# Patient Record
Sex: FEMALE | Race: BLACK OR AFRICAN AMERICAN | Employment: FULL TIME | URBAN - METROPOLITAN AREA
[De-identification: names, ages, dates, MRNs, and addresses within clinical notes are randomized per-mention and may not be internally consistent; named-entity substitution may affect disease eponyms.]

---

## 2017-01-03 ENCOUNTER — MYC MEDICAL ADVICE (OUTPATIENT)
Dept: FAMILY MEDICINE | Facility: CLINIC | Age: 42
End: 2017-01-03

## 2017-01-03 DIAGNOSIS — N92.1 MENORRHAGIA WITH IRREGULAR CYCLE: Primary | ICD-10-CM

## 2017-01-03 RX ORDER — MEDROXYPROGESTERONE ACETATE 10 MG
TABLET ORAL
Qty: 10 TABLET | Refills: 0 | Status: SHIPPED | OUTPATIENT
Start: 2017-01-03 | End: 2018-01-10

## 2017-01-18 ENCOUNTER — INFUSION THERAPY VISIT (OUTPATIENT)
Dept: INFUSION THERAPY | Facility: CLINIC | Age: 42
End: 2017-01-18
Attending: FAMILY MEDICINE
Payer: COMMERCIAL

## 2017-01-18 VITALS
OXYGEN SATURATION: 99 % | SYSTOLIC BLOOD PRESSURE: 155 MMHG | DIASTOLIC BLOOD PRESSURE: 73 MMHG | TEMPERATURE: 99.5 F | HEART RATE: 95 BPM | RESPIRATION RATE: 18 BRPM

## 2017-01-18 DIAGNOSIS — D50.8 OTHER IRON DEFICIENCY ANEMIA: Primary | ICD-10-CM

## 2017-01-18 PROCEDURE — 25000125 ZZHC RX 250: Mod: ZF | Performed by: FAMILY MEDICINE

## 2017-01-18 PROCEDURE — 25000132 ZZH RX MED GY IP 250 OP 250 PS 637: Mod: ZF | Performed by: FAMILY MEDICINE

## 2017-01-18 PROCEDURE — 96365 THER/PROPH/DIAG IV INF INIT: CPT | Mod: ZF

## 2017-01-18 PROCEDURE — 25000128 H RX IP 250 OP 636: Mod: ZF | Performed by: FAMILY MEDICINE

## 2017-01-18 PROCEDURE — 96366 THER/PROPH/DIAG IV INF ADDON: CPT | Mod: ZF

## 2017-01-18 RX ORDER — ACETAMINOPHEN 325 MG/1
650 TABLET ORAL
Status: DISCONTINUED | OUTPATIENT
Start: 2017-01-18 | End: 2017-01-18 | Stop reason: HOSPADM

## 2017-01-18 RX ORDER — DIPHENHYDRAMINE HCL 25 MG
25 CAPSULE ORAL
Status: DISCONTINUED | OUTPATIENT
Start: 2017-01-18 | End: 2017-01-18 | Stop reason: HOSPADM

## 2017-01-18 RX ADMIN — IRON SUCROSE 300 MG: 20 INJECTION, SOLUTION INTRAVENOUS at 09:22

## 2017-01-18 RX ADMIN — ACETAMINOPHEN 650 MG: 325 TABLET ORAL at 09:01

## 2017-01-18 NOTE — MR AVS SNAPSHOT
After Visit Summary   1/18/2017    Aguilar Genao    MRN: [id number]           Patient Information     Date Of Birth          1975        Visit Information        Provider Department      1/18/2017 9:00 AM  51 ATC;  SPEC INFUSION [facility] Specialty [facility] Procedure        Today's Diagnoses     Other iron deficiency anemia    -  1       Care Instructions    Dear Aguilar Genao    Thank you for choosing [facility] Special[facility] Infusion [facility] ([facility]) for your infusion.  The following information is a summary of our appointment as well as important reminders.        Patient Education    Iron Sucrose Chewable tablet    Iron Sucrose Solution for injection  Iron Sucrose Solution for injection  What is this medicine?  IRON SUCROSE (AHY perez MARIN krohs) is an iron complex. Iron is used to make healthy red blood cells, which carry oxygen and nutrients throughout the body. This medicine is used to treat iron deficiency anemia in people with chronic kidney disease.  This medicine may be used for other purposes; ask your health care provider or pharmacist if you have questions.  What should I tell my health care provider before I take this medicine?  They need to know if you have any of these conditions:    anemia not caused by low iron levels    heart disease    high levels of iron in the blood    kidney disease    liver disease    an unusual or allergic reaction to iron, other medicines, foods, dyes, or preservatives    pregnant or trying to get pregnant    breast-feeding  How should I use this medicine?  This medicine is for infusion into a vein. It is given by a health care professional in a hospital or clinic setting.  Talk to your pediatrician regarding the use of this medicine in children. While this drug may be prescribed for children as young as 2 years for selected conditions, precautions do apply.  Overdosage: If you think you have taken  too much of this medicine contact a poison control center or emergency room at once.  NOTE: This medicine is only for you. Do not share this medicine with others.  What if I miss a dose?  It is important not to miss your dose. Call your doctor or health care professional if you are unable to keep an appointment.  What may interact with this medicine?  Do not take this medicine with any of the following medications:    deferoxamine    dimercaprol    other iron products  This medicine may also interact with the following medications:    chloramphenicol    deferasirox  This list may not describe all possible interactions. Give your health care provider a list of all the medicines, herbs, non-prescription drugs, or dietary supplements you use. Also tell them if you smoke, drink alcohol, or use illegal drugs. Some items may interact with your medicine.  What should I watch for while using this medicine?  Visit your doctor or healthcare professional regularly. Tell your doctor or healthcare professional if your symptoms do not start to get better or if they get worse. You may need blood work done while you are taking this medicine.  You may need to follow a special diet. Talk to your doctor. Foods that contain iron include: whole grains/cereals, dried fruits, beans, or peas, leafy green vegetables, and organ meats (liver, kidney).  What side effects may I notice from receiving this medicine?  Side effects that you should report to your doctor or health care professional as soon as possible:    allergic reactions like skin rash, itching or hives, swelling of the face, lips, or tongue    breathing problems    changes in blood pressure    cough    fast, irregular heartbeat    feeling faint or lightheaded, falls    fever or chills    flushing, sweating, or hot feelings    joint or muscle aches/pains    seizures    swelling of the ankles or feet    unusually weak or tired  Side effects that usually do not require medical  attention (report to your doctor or health care professional if they continue or are bothersome):    diarrhea    feeling achy    headache    irritation at site where injected    nausea, vomiting    stomach upset    tiredness  This list may not describe all possible side effects. Call your doctor for medical advice about side effects. You may report side effects to FDA at 4-528-PLC-5502.  Where should I keep my medicine?  This drug is given in a hospital or clinic and will not be stored at home.  NOTE:This sheet is a summary. It may not cover all possible information. If you have questions about this medicine, talk to your doctor, pharmacist, or health care provider. Copyright  2016 Gold Standard          Additional information: you had your first infusion of Venofer today.       We look forward in seeing you on your next appointment here at [facility].  Please don t hesitate to call us at [phone] to reschedule any of your appointments or to speak with one of the [facility] registered nurses.  It was a pleasure taking care of you today.    Sincerely,  Angelina Nino RN  [facility]  Specialty Infusion & Procedure Center  [address] [place] [address]  [address]  Phone:  [phone]          Follow-ups after your visit        Your next 10 appointments already scheduled     Jan 19, 2017 11:00 AM   Fibroid Visit with Mahi Lemon MD   [facility] ([facility])    [facility] [address]  [address],[facility] [address]  [address] [id number] [phone]            Jan 24, 2017  3:00 PM   Infusion 120 with UC SPEC INFUSION, UC 45 ATC   [facility] Special[facility] ( [facility])    [address] [place] [address] [phone]            Jan 31, 2017  2:00 PM   Infusion 120 with UC SPEC INFUSION, UC 45 ATC   [facility] [place] Specialty and Procedure  ([facility])    [address] [place] [address]  2nd Floor  [facility] [id number] [phone]            Feb 02, 2017  8:45 AM   LAB with  LAB   [facility] ([facility])    [address] [place] [address]  1st [facility] [id number] [phone]           Patient must bring picture ID.  Patient should be prepared to give a urine specimen  Please do not eat 10-12 hours before your appointment if you are coming in fasting for labs on lipids, cholesterol, or glucose (sugar).  Pregnant women should follow their Care Team instructions. Water with medications is okay. Do not drink coffee or other fluids.   If you have concerns about taking  your medications, please ask at office or if scheduling via [employer], send a message by clicking on Secure Messaging, Message Your Care Team.            Feb 02, 2017  9:25 AM   (Arrive by 9:10 AM)   Return Visit with Aren Ricks MD   [facility] ([facility])    [address] [place] [address]  4th [facility] [address] [phone]              Who to contact     If you have questions or need follow up information about today's clinic visit or your schedule please contact [facility] SPECIALTY AND PROCEDURE directly at [phone].  Normal or non-critical lab and imaging results will be communicated to you by [employer], letter or phone within 4 business days after the clinic has received the results. If you do not hear from us within 7 days, please contact the clinic through [employer] or phone. If you have a critical or abnormal lab result, we will notify you by phone as soon as possible.  Submit refill requests through [employer] or call your pharmacy and they will forward the refill request to us. Please allow 3 business days for your refill to be completed.          Additional Information About Your Visit        Predikt Information     [employer] gives you secure access  to your electronic health record. If you see a primary care provider, you can also send messages to your care team and make appointments. If you have questions, please call your primary care clinic.  If you do not have a primary care provider, please call [phone] and they will assist you.        Care EveryWhere ID     This is your Care EveryWhere ID. This could be used by other organizations to access your [facility] medical records  [id number]        Your Vitals Were     Pulse Temperature Respirations Pulse Oximetry Last Period Breastfeeding?    95 99.5  F (37.5  C) (Tympanic) 18 99% 12/28/2016 (Approximate) No       Blood Pressure from Last 3 Encounters:   01/18/17 155/73   11/16/16 133/88   10/27/16 135/84    Weight from Last 3 Encounters:   11/16/16 127.461 kg (281 lb)   10/27/16 127.325 kg (280 lb 11.2 oz)   08/15/16 125.737 kg (277 lb 3.2 oz)              Today, you had the following     No orders found for display       Primary Care Provider Office Phone # Fax #    Aren Ricks [phone]        PH[facility]ICIANS [address] [facility] [address]  [facility] [address]        Thank you!     Thank you for choosing [facility] SPECIALTY AND PROCEDURE  for your care. Our goal is always to provide you with excellent care. Hearing back from our patients is one way we can continue to improve our services. Please take a few minutes to complete the written survey that you may receive in the mail after your visit with us. Thank you!             Your Updated Medication List - Protect others around you: Learn how to safely use, store and throw away your medicines at www.disposemymeds.org.          This list is accurate as of: 1/18/17 10:24 AM.  Always use your most recent med list.                   Brand Name Dispense Instructions for use    Alcohol Wipes 70 % Pads     100 each    1 each every 30 days       BENADRYL 25 MG tablet   Generic drug:  diphenhydrAMINE      Take 50 mg by  "mouth every 6 hours as needed for itching       calcium carbonate 500 MG chewable tablet    TUMS    100 tablet    Take 1 tablet (500 mg) by mouth daily       cetirizine 10 MG tablet    zyrTEC    90 tablet    Take 1 tablet (10 mg) by mouth daily       clotrimazole 1 % cream    LOTRIMIN    60 g    Apply topically 2 times daily To areas of       cyanocobalamin 1000 MCG/ML injection    VITAMIN B12    1 mL    Inject 1 mL (1,000 mcg) into the muscle every 30 days       ferrous sulfate 220 (44 FE) MG/5ML Liqd     473 mL    Take 10 mLs by mouth 2 times daily (with meals)       hydrOXYzine 25 MG tablet    ATARAX    180 tablet    Take one or two at bedtime for pruritis or sleep       levothyroxine 25 MCG tablet    SYNTHROID/LEVOTHROID    90 tablet    Take 1 tablet (25 mcg) by mouth daily       medroxyPROGESTERone 10 MG tablet    PROVERA    10 tablet    1 tab po daily       Miconazole Nitrate 2 % Powd     500 g    Use twice daily to intertriginous areas for yeast infection when present       order for DME     1 Device    Equipment being ordered: shower chair       spironolactone 25 MG tablet    ALDACTONE    90 tablet    Take 1 tablet (25 mg) by mouth daily       Syringe Luer Lock 25G X 1\" 3 ML Misc     1 each    1 each every 30 days       traZODone 50 MG tablet    DESYREL    135 tablet    Take 1.5 tablets (75 mg) by mouth At Bedtime         "

## 2017-01-18 NOTE — PATIENT INSTRUCTIONS
Dear Aguilar Genao    Thank you for choosing [facility] Specialty Infusion [facility] Procedure Center ([facility]) for your infusion.  The following information is a summary of our appointment as well as important reminders.        Patient Education    Iron Sucrose Chewable tablet    Iron Sucrose Solution for injection  Iron Sucrose Solution for injection  What is this medicine?  IRON SUCROSE (DEVANTE FUNES krohs) is an iron complex. Iron is used to make healthy red blood cells, which carry oxygen and nutrients throughout the body. This medicine is used to treat iron deficiency anemia in people with chronic kidney disease.  This medicine may be used for other purposes; ask your health care provider or pharmacist if you have questions.  What should I tell my health care provider before I take this medicine?  They need to know if you have any of these conditions:    anemia not caused by low iron levels    heart disease    high levels of iron in the blood    kidney disease    liver disease    an unusual or allergic reaction to iron, other medicines, foods, dyes, or preservatives    pregnant or trying to get pregnant    breast-feeding  How should I use this medicine?  This medicine is for infusion into a vein. It is given by a health care professional in a hospital or clinic setting.  Talk to your pediatrician regarding the use of this medicine in children. While this drug may be prescribed for children as young as 2 years for selected conditions, precautions do apply.  Overdosage: If you think you have taken too much of this medicine contact a poison control center or emergency room at once.  NOTE: This medicine is only for you. Do not share this medicine with others.  What if I miss a dose?  It is important not to miss your dose. Call your doctor or health care professional if you are unable to keep an appointment.  What may interact with this medicine?  Do not take this medicine with any of the following  medications:    deferoxamine    dimercaprol    other iron products  This medicine may also interact with the following medications:    chloramphenicol    deferasirox  This list may not describe all possible interactions. Give your health care provider a list of all the medicines, herbs, non-prescription drugs, or dietary supplements you use. Also tell them if you smoke, drink alcohol, or use illegal drugs. Some items may interact with your medicine.  What should I watch for while using this medicine?  Visit your doctor or healthcare professional regularly. Tell your doctor or healthcare professional if your symptoms do not start to get better or if they get worse. You may need blood work done while you are taking this medicine.  You may need to follow a special diet. Talk to your doctor. Foods that contain iron include: whole grains/cereals, dried fruits, beans, or peas, leafy green vegetables, and organ meats (liver, kidney).  What side effects may I notice from receiving this medicine?  Side effects that you should report to your doctor or health care professional as soon as possible:    allergic reactions like skin rash, itching or hives, swelling of the face, lips, or tongue    breathing problems    changes in blood pressure    cough    fast, irregular heartbeat    feeling faint or lightheaded, falls    fever or chills    flushing, sweating, or hot feelings    joint or muscle aches/pains    seizures    swelling of the ankles or feet    unusually weak or tired  Side effects that usually do not require medical attention (report to your doctor or health care professional if they continue or are bothersome):    diarrhea    feeling achy    headache    irritation at site where injected    nausea, vomiting    stomach upset    tiredness  This list may not describe all possible side effects. Call your doctor for medical advice about side effects. You may report side effects to FDA at 5-779-FDA-8980.  Where should I keep  my medicine?  This drug is given in a hospital or clinic and will not be stored at home.  NOTE:This sheet is a summary. It may not cover all possible information. If you have questions about this medicine, talk to your doctor, pharmacist, or health care provider. Copyright  2016 Gold Standard          Additional information: you had your first infusion of Venofer today.       We look forward in seeing you on your next appointment here at [facility].  Please don t hesitate to call us at [phone] to reschedule any of your appointments or to speak with one of the [facility] registered nurses.  It was a pleasure taking care of you today.    Sincerely,  Angelina Nino RN  [facility]  Specialty Infusion & Procedure Center  [address] [place] [address]  [address]  Phone:  [phone]

## 2017-01-18 NOTE — PROGRESS NOTES
Nursing Note  Aguilar Genao presents today to [facility] for:   Chief Complaint   Patient presents with     Infusion     Venofer     During today's [facility] appointment, orders from Dr. Ricks were completed.  Frequency: today is dose 1 of 3 total; doses to be given at least 48 hours apart.    Progress note:  Patient identification verified by name and date of birth.  Assessment completed.  Vitals recorded in Doc Flowsheets.  Patient was provided with education regarding infusion and possible side effects.  Patient verbalized understanding.      needed: No  Premedications: Tylenol 650 mg PO administered per order. and the following pre-medications were taken by patient prior to [facility] appointment: Benadryl 25 mg PO.  Infusion Rates: infusion given over approximately 90 minutes.  Approximate Infusion length:2 hours.   Labs: were not ordered for this appointment.  Vascular access: peripheral IV placed today.  Treatment Conditions: non-applicable.  Patient tolerated infusion: well.    Discharge Plan:   Follow up plan of care with: ongoing infusions at [facility]. and primary medical doctor.  Discharge instructions were reviewed with patient.  Patient/representative verbalized understanding of discharge instructions and all questions answered.  Patient discharged from [facility] in stable condition.    Angelina Nino RN    Administrations This Visit     acetaminophen (TYLENOL) tablet 650 mg     Admin Date Action Dose Route Administered By             01/18/2017 Given 650 mg Oral Angelina Nino RN                    iron sucrose (VENOFER) 300 mg in NaCl 0.9 % 250 mL intermittent infusion     Admin Date Action Dose Route Administered By             01/18/2017 New Bag 300 mg Intravenous Angelina Nino RN                          /73 mmHg  Pulse  95  Temp(Src) 99.5  F (37.5  C) (Tympanic)  Resp 18  SpO2 99%  LMP 12/28/2016 (Approximate)  Breastfeeding? No

## 2017-01-30 DIAGNOSIS — D50.8 OTHER IRON DEFICIENCY ANEMIAS: Primary | ICD-10-CM

## 2017-02-14 ENCOUNTER — MYC MEDICAL ADVICE (OUTPATIENT)
Dept: FAMILY MEDICINE | Facility: CLINIC | Age: 42
End: 2017-02-14

## 2017-02-16 ENCOUNTER — MEDICAL CORRESPONDENCE (OUTPATIENT)
Dept: HEALTH INFORMATION MANAGEMENT | Facility: CLINIC | Age: 42
End: 2017-02-16

## 2017-02-16 DIAGNOSIS — E03.9 HYPOTHYROIDISM: ICD-10-CM

## 2017-02-16 RX ORDER — LEVOTHYROXINE SODIUM 25 UG/1
25 TABLET ORAL DAILY
Qty: 90 TABLET | Refills: 1 | Status: SHIPPED | OUTPATIENT
Start: 2017-02-16 | End: 2017-08-07

## 2017-02-17 ENCOUNTER — INFUSION THERAPY VISIT (OUTPATIENT)
Dept: INFUSION THERAPY | Facility: CLINIC | Age: 42
End: 2017-02-17
Attending: SURGERY
Payer: COMMERCIAL

## 2017-02-17 VITALS
DIASTOLIC BLOOD PRESSURE: 60 MMHG | RESPIRATION RATE: 18 BRPM | OXYGEN SATURATION: 100 % | SYSTOLIC BLOOD PRESSURE: 128 MMHG | TEMPERATURE: 96.1 F | HEART RATE: 78 BPM

## 2017-02-17 DIAGNOSIS — D50.8 OTHER IRON DEFICIENCY ANEMIA: Primary | ICD-10-CM

## 2017-02-17 DIAGNOSIS — D50.0 IRON DEFICIENCY ANEMIA DUE TO CHRONIC BLOOD LOSS: ICD-10-CM

## 2017-02-17 DIAGNOSIS — D50.8 OTHER IRON DEFICIENCY ANEMIAS: ICD-10-CM

## 2017-02-17 LAB
ALBUMIN SERPL-MCNC: 3 G/DL (ref 3.4–5)
ALP SERPL-CCNC: 54 U/L (ref 40–150)
ALT SERPL W P-5'-P-CCNC: 15 U/L (ref 0–50)
ANION GAP SERPL CALCULATED.3IONS-SCNC: 8 MMOL/L (ref 3–14)
AST SERPL W P-5'-P-CCNC: 22 U/L (ref 0–45)
BASOPHILS # BLD AUTO: 0 10E9/L (ref 0–0.2)
BASOPHILS NFR BLD AUTO: 0.3 %
BILIRUB SERPL-MCNC: 0.6 MG/DL (ref 0.2–1.3)
BUN SERPL-MCNC: 3 MG/DL (ref 7–30)
CALCIUM SERPL-MCNC: 7.8 MG/DL (ref 8.5–10.1)
CHLORIDE SERPL-SCNC: 109 MMOL/L (ref 94–109)
CO2 SERPL-SCNC: 26 MMOL/L (ref 20–32)
CREAT SERPL-MCNC: 0.69 MG/DL (ref 0.52–1.04)
DIFFERENTIAL METHOD BLD: ABNORMAL
EOSINOPHIL # BLD AUTO: 0.1 10E9/L (ref 0–0.7)
EOSINOPHIL NFR BLD AUTO: 1.6 %
ERYTHROCYTE [DISTWIDTH] IN BLOOD BY AUTOMATED COUNT: 25.1 % (ref 10–15)
FERRITIN SERPL-MCNC: 7 NG/ML (ref 12–150)
GFR SERPL CREATININE-BSD FRML MDRD: ABNORMAL ML/MIN/1.7M2
GLUCOSE SERPL-MCNC: 77 MG/DL (ref 70–99)
HCT VFR BLD AUTO: 23.9 % (ref 35–47)
HGB BLD-MCNC: 7 G/DL (ref 11.7–15.7)
IMM GRANULOCYTES # BLD: 0 10E9/L (ref 0–0.4)
IMM GRANULOCYTES NFR BLD: 0.5 %
LYMPHOCYTES # BLD AUTO: 0.9 10E9/L (ref 0.8–5.3)
LYMPHOCYTES NFR BLD AUTO: 23.1 %
MCH RBC QN AUTO: 20 PG (ref 26.5–33)
MCHC RBC AUTO-ENTMCNC: 29.3 G/DL (ref 31.5–36.5)
MCV RBC AUTO: 68 FL (ref 78–100)
MONOCYTES # BLD AUTO: 0.4 10E9/L (ref 0–1.3)
MONOCYTES NFR BLD AUTO: 10.9 %
NEUTROPHILS # BLD AUTO: 2.4 10E9/L (ref 1.6–8.3)
NEUTROPHILS NFR BLD AUTO: 63.6 %
NRBC # BLD AUTO: 0 10*3/UL
NRBC BLD AUTO-RTO: 0 /100
PLATELET # BLD AUTO: 167 10E9/L (ref 150–450)
POTASSIUM SERPL-SCNC: 3.4 MMOL/L (ref 3.4–5.3)
PROT SERPL-MCNC: 7 G/DL (ref 6.8–8.8)
RBC # BLD AUTO: 3.5 10E12/L (ref 3.8–5.2)
SODIUM SERPL-SCNC: 143 MMOL/L (ref 133–144)
VIT B12 SERPL-MCNC: 361 PG/ML (ref 193–986)
WBC # BLD AUTO: 3.8 10E9/L (ref 4–11)

## 2017-02-17 PROCEDURE — 96365 THER/PROPH/DIAG IV INF INIT: CPT

## 2017-02-17 PROCEDURE — 25000128 H RX IP 250 OP 636: Mod: ZF | Performed by: FAMILY MEDICINE

## 2017-02-17 PROCEDURE — 96366 THER/PROPH/DIAG IV INF ADDON: CPT

## 2017-02-17 PROCEDURE — 25000130 H RX MED GY IP 250 OP 259 PS 637: Mod: ZF | Performed by: FAMILY MEDICINE

## 2017-02-17 PROCEDURE — 25000132 ZZH RX MED GY IP 250 OP 250 PS 637: Mod: ZF | Performed by: FAMILY MEDICINE

## 2017-02-17 RX ORDER — DIPHENHYDRAMINE HCL 25 MG
25 CAPSULE ORAL
Status: DISCONTINUED | OUTPATIENT
Start: 2017-02-17 | End: 2017-02-17 | Stop reason: HOSPADM

## 2017-02-17 RX ORDER — ACETAMINOPHEN 325 MG/1
650 TABLET ORAL
Status: DISCONTINUED | OUTPATIENT
Start: 2017-02-17 | End: 2017-02-17 | Stop reason: HOSPADM

## 2017-02-17 RX ORDER — DIPHENHYDRAMINE HCL 25 MG
25 TABLET ORAL
Status: CANCELLED
Start: 2017-02-19

## 2017-02-17 RX ORDER — ACETAMINOPHEN 325 MG/1
650 TABLET ORAL
Status: CANCELLED
Start: 2017-02-19

## 2017-02-17 RX ADMIN — DIPHENHYDRAMINE HYDROCHLORIDE 25 MG: 25 CAPSULE ORAL at 15:02

## 2017-02-17 RX ADMIN — IRON SUCROSE 300 MG: 20 INJECTION, SOLUTION INTRAVENOUS at 15:15

## 2017-02-17 RX ADMIN — ACETAMINOPHEN 325 MG: 325 TABLET ORAL at 15:05

## 2017-02-17 NOTE — MR AVS SNAPSHOT
After Visit Summary   2/17/2017    Aguilar Genao    MRN: [id number]           Patient Information     Date Of Birth          1975        Visit Information        Provider Department      2/17/2017 3:00 PM UC 51 ATC; UC SPEC INFUSION [facility] Specialty and Procedure        Today's Diagnoses     Other iron deficiency anemia    -  1       Follow-ups after your visit        Who to contact     If you have questions or need follow up information about today's clinic visit or your schedule please contact [facility] SPECIALTY AND PROCEDURE directly at [phone].  Normal or non-critical lab and imaging results will be communicated to you by Plastiohart, letter or phone within 4 business days after the clinic has received the results. If you do not hear from us within 7 days, please contact the clinic through [employer] or phone. If you have a critical or abnormal lab result, we will notify you by phone as soon as possible.  Submit refill requests through [employer] or call your pharmacy and they will forward the refill request to us. Please allow 3 business days for your refill to be completed.          Additional Information About Your Visit        MyChart Information     [employer] gives you secure access to your electronic health record. If you see a primary care provider, you can also send messages to your care team and make appointments. If you have questions, please call your primary care clinic.  If you do not have a primary care provider, please call [phone] and they will assist you.        Care EveryWhere ID     This is your Care EveryWhere ID. This could be used by other organizations to access your [facility] medical records  [id number]        Your Vitals Were     Pulse Temperature Respirations Last Period Pulse Oximetry       78 96.1  F (35.6  C) (Oral) 18 12/28/2016 (Approximate) 100%        Blood Pressure from Last 3 Encounters:   02/17/17 128/60    01/18/17 (P) 134/66   11/16/16 133/88    Weight from Last 3 Encounters:   11/16/16 127.5 kg (281 lb)   10/27/16 127.3 kg (280 lb 11.2 oz)   08/15/16 125.7 kg (277 lb 3.2 oz)              Today, you had the following     No orders found for display       Primary Care Provider Office Phone # Fax #    Aren Ricks [phone]        PHYSICIANS [address] [facility] [address]  [facility] [address]        Thank you!     Thank you for choosing [facility] SPECIALTY AND PROCEDURE  for your care. Our goal is always to provide you with excellent care. Hearing back from our patients is one way we can continue to improve our services. Please take a few minutes to complete the written survey that you may receive in the mail after your visit with us. Thank you!             Your Updated Medication List - Protect others around you: Learn how to safely use, store and throw away your medicines at www.disposemymeds.org.          This list is accurate as of: 2/17/17  4:58 PM.  Always use your most recent med list.                   Brand Name Dispense Instructions for use    Alcohol Wipes 70 % Pads     100 each    1 each every 30 days       BENADRYL 25 MG tablet   Generic drug:  diphenhydrAMINE      Take 50 mg by mouth every 6 hours as needed for itching       calcium carbonate 500 MG chewable tablet    TUMS    100 tablet    Take 1 tablet (500 mg) by mouth daily       cetirizine 10 MG tablet    zyrTEC    90 tablet    Take 1 tablet (10 mg) by mouth daily       clotrimazole 1 % cream    LOTRIMIN    60 g    Apply topically 2 times daily To areas of       cyanocobalamin 1000 MCG/ML injection    VITAMIN B12    1 mL    Inject 1 mL (1,000 mcg) into the muscle every 30 days       ferrous sulfate 220 (44 FE) MG/5ML Liqd     473 mL    Take 10 mLs by mouth 2 times daily (with meals)       hydrOXYzine 25 MG tablet    ATARAX    180 tablet    Take one or two at bedtime for pruritis or sleep        "levothyroxine 25 MCG tablet    SYNTHROID/LEVOTHROID    90 tablet    Take 1 tablet (25 mcg) by mouth daily       medroxyPROGESTERone 10 MG tablet    PROVERA    10 tablet    1 tab po daily       Miconazole Nitrate 2 % Powd     500 g    Use twice daily to intertriginous areas for yeast infection when present       order for DME     1 Device    Equipment being ordered: shower chair       spironolactone 25 MG tablet    ALDACTONE    90 tablet    Take 1 tablet (25 mg) by mouth daily       Syringe Luer Lock 25G X 1\" 3 ML Misc     1 each    1 each every 30 days       traZODone 50 MG tablet    DESYREL    135 tablet    Take 1.5 tablets (75 mg) by mouth At Bedtime         "

## 2017-02-17 NOTE — PROGRESS NOTES
Infusion Nursing Note:  Aguilar Genao presents today to [facility] for a ven[facility]er infusion.  During today's [facility] appointment orders from Dr. Ricks were completed.  Frequency: dose 2/3    Progress note:  ID verified by name and .  Assessment completed. Vitals were stable throughout time in [facility]. Patient education regarding infusion and possible side effects provided.  Patient verbalized understanding. yes    Premedications: were not ordered.    Infusion Rates: Infusion given over approximately 90 minutes.     Labs were not ordered for this appointment.    Vascular access: Peripheral IV placed today.    Treatment Conditions:   No treatment conditions.    Patient tolerated infusion well.    Discharge Plan:   Follow up plan of care with: [facility]ng[facility]  Discharge instructions were reviewed with patient.  Patient/representative verbalized understanding of discharge instructions and all questions answered.    Patient discharged from [facility] in stable condition.    Francine Jacobson RN    Administrations This Visit     acetaminophen (TYLENOL) tablet 650 mg     Admin Date Action Dose Route Administered By             2017 Given 325 mg Oral Francine Jacobson RN                    diphenhydrAMINE (BENADRYL) capsule 25 mg     Admin Date Action Dose Route Administered By             2017 Given 25 mg Oral Francine Jacobson RN                    iron sucrose (VENOFER) 300 mg in NaCl 0.9 % 250 mL intermittent infusion     Admin Date Action Dose Rate Route Administered By          2017 New Bag 300 mg 193.3 mL/hr Intravenous Francine Jacobson RN                         /72  Pulse 79  Temp 96.1  F (35.6  C) (Oral)  Resp 18  LMP 2016 (Approximate)  SpO2 100%

## 2017-02-21 ENCOUNTER — MYC MEDICAL ADVICE (OUTPATIENT)
Dept: FAMILY MEDICINE | Facility: CLINIC | Age: 42
End: 2017-02-21

## 2017-02-23 ENCOUNTER — TELEPHONE (OUTPATIENT)
Dept: INTERNAL MEDICINE | Facility: CLINIC | Age: 42
End: 2017-02-23

## 2017-02-23 DIAGNOSIS — Z98.84 STATUS POST BARIATRIC SURGERY: ICD-10-CM

## 2017-02-23 DIAGNOSIS — K70.30 ALCOHOLIC CIRRHOSIS OF LIVER WITHOUT ASCITES (H): ICD-10-CM

## 2017-02-23 DIAGNOSIS — D50.8 OTHER IRON DEFICIENCY ANEMIAS: Primary | ICD-10-CM

## 2017-02-23 NOTE — TELEPHONE ENCOUNTER
Results for orders placed or performed in visit on 02/17/17   Vitamin B12   Result Value Ref Range    Vitamin B12 361 193 - 986 pg/mL   CBC with platelets differential   Result Value Ref Range    WBC 3.8 (L) 4.0 - 11.0 10e9/L    RBC Count 3.50 (L) 3.8 - 5.2 10e12/L    Hemoglobin 7.0 (L) 11.7 - 15.7 g/dL    Hematocrit 23.9 (L) 35.0 - 47.0 %    MCV 68 (L) 78 - 100 fl    MCH 20.0 (L) 26.5 - 33.0 pg    MCHC 29.3 (L) 31.5 - 36.5 g/dL    RDW 25.1 (H) 10.0 - 15.0 %    Platelet Count 167 150 - 450 10e9/L    Diff Method Automated Method     % Neutrophils 63.6 %    % Lymphocytes 23.1 %    % Monocytes 10.9 %    % Eosinophils 1.6 %    % Basophils 0.3 %    % Immature Granulocytes 0.5 %    Nucleated RBCs 0 0 /100    Absolute Neutrophil 2.4 1.6 - 8.3 10e9/L    Absolute Lymphocytes 0.9 0.8 - 5.3 10e9/L    Absolute Monocytes 0.4 0.0 - 1.3 10e9/L    Absolute Eosinophils 0.1 0.0 - 0.7 10e9/L    Absolute Basophils 0.0 0.0 - 0.2 10e9/L    Abs Immature Granulocytes 0.0 0 - 0.4 10e9/L    Absolute Nucleated RBC 0.0    Comprehensive metabolic panel   Result Value Ref Range    Sodium 143 133 - 144 mmol/L    Potassium 3.4 3.4 - 5.3 mmol/L    Chloride 109 94 - 109 mmol/L    Carbon Dioxide 26 20 - 32 mmol/L    Anion Gap 8 3 - 14 mmol/L    Glucose 77 70 - 99 mg/dL    Urea Nitrogen 3 (L) 7 - 30 mg/dL    Creatinine 0.69 0.52 - 1.04 mg/dL    GFR Estimate >90  Non  GFR Calc   >60 mL/min/1.7m2    GFR Estimate If Black >90   GFR Calc   >60 mL/min/1.7m2    Calcium 7.8 (L) 8.5 - 10.1 mg/dL    Bilirubin Total 0.6 0.2 - 1.3 mg/dL    Albumin 3.0 (L) 3.4 - 5.0 g/dL    Protein Total 7.0 6.8 - 8.8 g/dL    Alkaline Phosphatase 54 40 - 150 U/L    ALT 15 0 - 50 U/L    AST 22 0 - 45 U/L   Ferritin   Result Value Ref Range    Ferritin 7 (L) 12 - 150 ng/mL   Dalia  Please call her.  1. Very low hemoglobin, iron deficiency anemia. I previously recommended Iron infusion. Was this ever completed? I see something set up for infusion  3/1/17. Need to keep appointment. I added order for FIT testing. Ask if any stomach pain if yes need to add Protonix.   2. Low albumin needs more protein in diet. Low calcium impacted by low albumin. If able to tolerate calcium rich foods increase in diet try Fairlife milk, continue Tums calcium supplement increase to twice daily. We could refer to nutrition if interested.  3. Please ask her to schedule follow-up with me.  Aren Ricks MD

## 2017-02-24 NOTE — TELEPHONE ENCOUNTER
Regarding: Millicent Pt- Returning your call   Contact: [phone]  Pt returning your call from earlier this afternoon regarding lab results. Pt requesting call back or requests this information to go through [facility] if unable. Tried calling your direct line but couldn't get through. Pt can be reached at [phone].     February 24, 2017 12:03 PM  Left additional message for patient to return call. Would prefer to review results over the phone. If not able to connect with her, will send information through [facility].  Dalia Garcia RN, Care Coordinator    February 28, 2017 10:49 AM  Spoke with patient. The iron infusion scheduled for tomorrow will be her 3rd infusion. The lab work that was done was after the 1st infusion. Patient has not yet scheduled with gynecology, but said that she will do that once she finishes with the iron infusion tomorrow. She continues to have prolonged and heavy menstrual bleeding. States her last menstrual cycle, she had bleeding for 2 months. It then stopped for a month, and now started back up about 3-4 days ago. She is aware of need to see gynecology, but does have significant problems scheduling multiple things due to the fatigue. Aside from current abdominal cramping, denies abdominal pain or need for Protonix. She is aware of need for FIT testing, and is going to try to do that while she is at the infusion center tomorrow.     Reviewed low albumin and low calcium levels, and adding protein and calcium to her diet. She had some interest in the nutrition referral, but due to fatigue, wanted to deal with her anemia issues first. At times she is already taking the Tums twice daily, but will make sure to do that. She was concerned about it interfering with her levothyroxine. Advised her to separate the Tums from the levothyroxine by about 4 hours to give time for adequate absorption.    Follow-up appointment with Dr. Ricks scheduled for Wed, 3/8/17 at 4:00.   Dalia Garcia RN,  Care Coordinator

## 2017-02-27 DIAGNOSIS — L29.9 PRURITIC DISORDER: ICD-10-CM

## 2017-02-27 RX ORDER — HYDROXYZINE HYDROCHLORIDE 25 MG/1
TABLET, FILM COATED ORAL
Qty: 180 TABLET | Refills: 3 | Status: CANCELLED | OUTPATIENT
Start: 2017-02-27

## 2017-02-27 NOTE — TELEPHONE ENCOUNTER
hydrOXYzine (ATARAX) 25 MG tablet  Last Written Prescription Date:  12/19/16  Last Fill Quantity: 180,   # refills: 3  Last Office Visit with G, P or [facility] prescribing provider: 10/27/16  Future Office visit:   none    Routing refill request to provider for review/approval because:   hydrOXYzine (ATARAX) 25 MG tablet. Fax requests per pt to have additional 1-2 during day prn anxiety, itching. (rx 11/30/15  D/c 12/16/16)   Per your note 12/19/16 rx changed to 180.  Please verify entry to desired.

## 2017-02-28 NOTE — TELEPHONE ENCOUNTER
Not sure what this request is about??   I sent appropriate RX for three month supply of 180 tabs which allows patient to take 1-2 at bedtime and sparingly  1-2 throughout the day if needed for pruritis from liver condition  which would be max 60 tabs per month limiting to 2 per day to avoid sedation sent 12/19/16 with 3 RF for one year.   I removed loaded RX as she should have above on file.  I hope that clarifies, if not patient will have to make an appointment with me to discuss.  Aguilar was seen today for refill request.    Diagnoses and all orders for this visit:    Pruritic disorder  -     hydrOXYzine (ATARAX) 25 MG tablet; Take one or two at bedtime for pruritis or sleep    Aren Ricks MD

## 2017-03-02 ENCOUNTER — TELEPHONE (OUTPATIENT)
Dept: INTERNAL MEDICINE | Facility: CLINIC | Age: 42
End: 2017-03-02

## 2017-03-02 NOTE — TELEPHONE ENCOUNTER
Requesting hydrOXYzine (ATARAX) 25 MG tablet 1-2 tablet at hs and 1-2 tablet during the day itching or anxiety. Gely let [facility] and pt know.

## 2017-03-03 NOTE — TELEPHONE ENCOUNTER
Dr. Ricks,     Patient is requesting for the prescription for hydroxyzine to be for 1-2 tabs twice daily. According to [facility], she has had it prescribed that way at some point in the past. I did leave message for patient to call back to discuss why she is requesting the increase.

## 2017-03-03 NOTE — TELEPHONE ENCOUNTER
She needs an appointment to discuss with me. I am worried about sedation Side effects compounded by her anemia so am recommending a lower dose for now. She needs to take as directed.  Best wishes,  Aren Ricks MD

## 2017-03-15 NOTE — TELEPHONE ENCOUNTER
I spoke with patient and she is in agreement with keeping the hydroxyzine at the lower dose for now. Part of the confusion came in because in the prescription dated 11/30/15, there was a note to pharmacy that read: , and there was a full year supply at that increased dose that she was able to get. Then when the prescription was renewed, that additional note wasn't added, so dose was decreased again. She does understand about the potential for increased sedation with the anemia, but hopeful that once her hemoglobin stabilizes, she will be able to go back to the increased dose.     Patient is aware that she is in need of completing one more iron infusion, and also scheduling follow-up appointment with Dr. Ricks. She has been informed that she needs to move by May 1, and so has been concerned about that. Will schedule appointments as soon as she is able.     Patient states that she was seen in the ER a couple days ago at [facility], and hemoglobin was up to 7.9 (was 7.0 on 2/17/17), so she was feeling positive about that.

## 2017-04-17 ENCOUNTER — MEDICAL CORRESPONDENCE (OUTPATIENT)
Dept: HEALTH INFORMATION MANAGEMENT | Facility: CLINIC | Age: 42
End: 2017-04-17

## 2017-05-16 ENCOUNTER — PRE VISIT (OUTPATIENT)
Dept: CARDIOLOGY | Facility: CLINIC | Age: 42
End: 2017-05-16

## 2017-06-03 DIAGNOSIS — D50.8 OTHER IRON DEFICIENCY ANEMIA: ICD-10-CM

## 2017-06-13 ENCOUNTER — PRE VISIT (OUTPATIENT)
Dept: GASTROENTEROLOGY | Facility: CLINIC | Age: 42
End: 2017-06-13

## 2017-06-13 DIAGNOSIS — K70.30 ALCOHOLIC CIRRHOSIS OF LIVER WITHOUT ASCITES (H): Primary | ICD-10-CM

## 2017-06-13 NOTE — TELEPHONE ENCOUNTER
Was the patient contacted by phone and reminded of the upcoming visit? message left    Was the patient instructed to bring a current list of all medications to the appointment or instructed to bring in all medication bottles? Yes     Is it anticipated the patient will need additional appointments? Yes, Testing    Were the additional appointments scheduled? No, will evaluate and schedule at the visit    Were ordered labs and tests completed prior to the appointment? Pt to have labs drawn at local  2-7 days prior to visit, if chooses to have drawn same day message left for pt to make lab appointment 1 hour prior to visit.    Were the needed lab orders placed? Yes    Patient instructed to arrive early for check-in    Saniya Luis CMA  6/13/2017 1:20 PM

## 2017-06-20 DIAGNOSIS — K70.30 ALCOHOLIC CIRRHOSIS OF LIVER WITHOUT ASCITES (H): ICD-10-CM

## 2017-06-20 LAB
ALBUMIN SERPL-MCNC: 3.2 G/DL (ref 3.4–5)
ALP SERPL-CCNC: 54 U/L (ref 40–150)
ALT SERPL W P-5'-P-CCNC: 21 U/L (ref 0–50)
ANION GAP SERPL CALCULATED.3IONS-SCNC: 5 MMOL/L (ref 3–14)
AST SERPL W P-5'-P-CCNC: 30 U/L (ref 0–45)
BILIRUB DIRECT SERPL-MCNC: 0.2 MG/DL (ref 0–0.2)
BILIRUB SERPL-MCNC: 0.5 MG/DL (ref 0.2–1.3)
BUN SERPL-MCNC: 9 MG/DL (ref 7–30)
CALCIUM SERPL-MCNC: 8.1 MG/DL (ref 8.5–10.1)
CHLORIDE SERPL-SCNC: 104 MMOL/L (ref 94–109)
CO2 SERPL-SCNC: 28 MMOL/L (ref 20–32)
CREAT SERPL-MCNC: 0.76 MG/DL (ref 0.52–1.04)
ERYTHROCYTE [DISTWIDTH] IN BLOOD BY AUTOMATED COUNT: 22.9 % (ref 10–15)
GFR SERPL CREATININE-BSD FRML MDRD: 82 ML/MIN/1.7M2
GLUCOSE SERPL-MCNC: 104 MG/DL (ref 70–99)
HCT VFR BLD AUTO: 26.9 % (ref 35–47)
HGB BLD-MCNC: 7.9 G/DL (ref 11.7–15.7)
INR PPP: 1.34 (ref 0.86–1.14)
MCH RBC QN AUTO: 19.6 PG (ref 26.5–33)
MCHC RBC AUTO-ENTMCNC: 29.4 G/DL (ref 31.5–36.5)
MCV RBC AUTO: 67 FL (ref 78–100)
PLATELET # BLD AUTO: 192 10E9/L (ref 150–450)
POTASSIUM SERPL-SCNC: 3.6 MMOL/L (ref 3.4–5.3)
PROT SERPL-MCNC: 8 G/DL (ref 6.8–8.8)
RBC # BLD AUTO: 4.04 10E12/L (ref 3.8–5.2)
SODIUM SERPL-SCNC: 137 MMOL/L (ref 133–144)
WBC # BLD AUTO: 4 10E9/L (ref 4–11)

## 2017-07-05 ENCOUNTER — MYC MEDICAL ADVICE (OUTPATIENT)
Dept: FAMILY MEDICINE | Facility: CLINIC | Age: 42
End: 2017-07-05

## 2017-07-07 NOTE — TELEPHONE ENCOUNTER
Spoke with patient. She does have a new provider at [facility]. Aware that order for Iron infusions should come from that person. She has sent a request for her medical records to go to that provider.

## 2017-08-07 DIAGNOSIS — F51.01 PRIMARY INSOMNIA: ICD-10-CM

## 2017-08-07 DIAGNOSIS — E03.9 HYPOTHYROIDISM: ICD-10-CM

## 2017-08-07 RX ORDER — LEVOTHYROXINE SODIUM 25 UG/1
25 TABLET ORAL DAILY
Qty: 90 TABLET | Refills: 0 | Status: SHIPPED | OUTPATIENT
Start: 2017-08-07

## 2017-08-07 RX ORDER — TRAZODONE HYDROCHLORIDE 50 MG/1
75 TABLET, FILM COATED ORAL AT BEDTIME
Qty: 135 TABLET | Refills: 0 | Status: SHIPPED | OUTPATIENT
Start: 2017-08-07 | End: 2017-11-16

## 2017-08-07 NOTE — TELEPHONE ENCOUNTER
levothyroxine (SYNTHROID/LEVOTHROID) 25 MCG tablet      Last Written Prescription Date:  2/16/2017  Last Fill Quantity: 90,   # refills: 1    TSH   Date Value Ref Range Status   10/27/2016 3.42 0.40 - 4.00 mU/L Final   ]  traZODone (DESYREL) 50 MG tablet      Last Written Prescription Date:  8/15/2016  Last Fill Quantity: 135,   # refills: 3    Last Office Visit : 10/27/2016  Future Office visit:  0

## 2017-08-22 ENCOUNTER — MEDICAL CORRESPONDENCE (OUTPATIENT)
Dept: HEALTH INFORMATION MANAGEMENT | Facility: CLINIC | Age: 42
End: 2017-08-22

## 2017-08-31 ENCOUNTER — OFFICE VISIT (OUTPATIENT)
Dept: FAMILY MEDICINE | Facility: CLINIC | Age: 42
End: 2017-08-31

## 2017-08-31 VITALS
BODY MASS INDEX: 41.46 KG/M2 | WEIGHT: 258 LBS | SYSTOLIC BLOOD PRESSURE: 123 MMHG | HEART RATE: 73 BPM | HEIGHT: 66 IN | DIASTOLIC BLOOD PRESSURE: 83 MMHG | RESPIRATION RATE: 16 BRPM

## 2017-08-31 DIAGNOSIS — K70.30 ALCOHOLIC CIRRHOSIS OF LIVER WITHOUT ASCITES (H): Primary | ICD-10-CM

## 2017-08-31 DIAGNOSIS — D50.8 OTHER IRON DEFICIENCY ANEMIA: ICD-10-CM

## 2017-08-31 ASSESSMENT — PAIN SCALES - GENERAL: PAINLEVEL: MILD PAIN (2)

## 2017-08-31 NOTE — MR AVS SNAPSHOT
After Visit Summary   8/31/2017    gAuilar Genao    MRN: [id number]           Patient Information     Date Of Birth          1975        Visit Information        Provider Department      8/31/2017 9:55 AM Aren Ricks MD [facility]        Today's Diagnoses     Alcoholic cirrhosis of liver without ascites (H)    -  1    Other iron deficiency anemia          Care Instructions    Primary Care Center Medication Refill Request Information:  * Please contact your pharmacy regarding ANY request for medication refills.  ** [facility] Prescription Fax = [phone]  * Please allow 3 business days for routine medication refills.  * Please allow 5 business days for controlled substance medication refills.     Primary Care Center Test Result notification information:  *You will be notified with in 7-10 days of your appointment day regarding the results of your test.  If you are on MyChart you will be notified as soon as the provider has reviewed the results and signed off on them.    [facility] [phone]             Follow-ups after your visit        Follow-up notes from your care team     Discussed this visit      Your next 10 appointments already scheduled     Anthony 10, 2018 10:10 AM CST   (Arrive by 9:55 AM)   Return General Liver with Stella Jin MD   [facility] ([facility])    [address] [place] [id number] [phone]              Who to contact     Please call your clinic at [phone] to:    Ask questions about your health    Make or cancel appointments    Discuss your medicines    Learn about your test results    Speak to your doctor   If you have compliments or concerns about an experience at your clinic, or if you wish to file a complaint, please contact [facility] Patient Relations at [phone] or email us at [email or web address]          "Additional Information About Your Visit        [employer] Information     [employer] gives you secure access to your electronic health record. If you see a primary care provider, you can also send messages to your care team and make appointments. If you have questions, please call your primary care clinic.  If you do not have a primary care provider, please call [phone] and they will assist you.      [employer] is an electronic gateway that provides easy, online access to your medical records. With [employer], you can request a clinic appointment, read your test results, renew a prescription or communicate with your care team.     To access your existing account, please contact your [facility] or call [phone] for assistance.        Care EveryWhere ID     This is your Care EveryWhere ID. This could be used by other organizations to access your [facility] medical records  [id number]        Your Vitals Were     Pulse Respirations Height BMI (Body Mass Index)          73 16 1.67 m (5' 5.75\") 41.96 kg/m2         Blood Pressure from Last 3 Encounters:   08/31/17 123/83   02/17/17 128/60   01/18/17 (P) 134/66    Weight from Last 3 Encounters:   08/31/17 117 kg (258 lb)   11/16/16 127.5 kg (281 lb)   10/27/16 127.3 kg (280 lb 11.2 oz)              Today, you had the following     No orders found for display       Primary Care Provider Office Phone # Fax #    Aren Ricks [phone]       [address] [facility] [address]        Equal Access to Services     CINDA MAGALLON : Hadii aad ku hadasho Soomaali, waaxda luqadaha, qaybta kaalmada adeegyada, eduardo lux . So [facility] [phone].    ATENCIÓN: Si habla español, tiene a thompson disposición servicios gratuitos de asistencia lingüística. Llame al [phone].    We comply with applicable federal civil rights laws and [place] laws. We do not discriminate on the basis of race, color, national " origin, age, disability sex, sexual orientation or gender identity.            Thank you!     Thank you for choosing [facility]  for your care. Our goal is always to provide you with excellent care. Hearing back from our patients is one way we can continue to improve our services. Please take a few minutes to complete the written survey that you may receive in the mail after your visit with us. Thank you!             Your Updated Medication List - Protect others around you: Learn how to safely use, store and throw away your medicines at www.disposemymeds.org.          This list is accurate as of: 8/31/17 12:56 PM.  Always use your most recent med list.                   Brand Name Dispense Instructions for use Diagnosis    Alcohol Wipes 70 % Pads     100 each    1 each every 30 days    Other iron deficiency anemia       BENADRYL 25 MG tablet   Generic drug:  diphenhydrAMINE      Take 50 mg by mouth every 6 hours as needed for itching        calcium carbonate 500 MG chewable tablet    TUMS    100 tablet    Take 1 tablet (500 mg) by mouth daily    Hypocalcemia       cetirizine 10 MG tablet    zyrTEC    90 tablet    Take 1 tablet (10 mg) by mouth daily    Pruritic disorder       clotrimazole 1 % cream    LOTRIMIN    60 g    Apply topically 2 times daily To areas of    Tinea corporis       cyanocobalamin 1000 MCG/ML injection    VITAMIN B12    1 mL    Inject 1 mL (1,000 mcg) into the muscle every 30 days    Other dietary vitamin B12 deficiency anemia       ferrous sulfate 220 (44 FE) MG/5ML Liqd     473 mL    Take 10 mLs by mouth 2 times daily (with meals)    Other iron deficiency anemias       hydrOXYzine 25 MG tablet    ATARAX    180 tablet    Take one or two at bedtime for pruritis or sleep    Pruritic disorder       levothyroxine 25 MCG tablet    SYNTHROID/LEVOTHROID    90 tablet    Take 1 tablet (25 mcg) by mouth daily    Hypothyroidism       medroxyPROGESTERone 10 MG tablet    PROVERA    10  "tablet    1 tab po daily    Menorrhagia with irregular cycle       Miconazole Nitrate 2 % Powd     500 g    Use twice daily to intertriginous areas for yeast infection when present    Tinea corporis       order for DME     1 Device    Equipment being ordered: shower chair    Cardiomegaly, Morbid obesity (H)       spironolactone 25 MG tablet    ALDACTONE    90 tablet    Take 1 tablet (25 mg) by mouth daily    Alcoholic cirrhosis of liver without ascites (H)       Syringe Luer Lock 25G X 1\" 3 ML Misc     1 each    1 each every 30 days    Other iron deficiency anemia       traZODone 50 MG tablet    DESYREL    135 tablet    Take 1.5 tablets (75 mg) by mouth At Bedtime    Primary insomnia         "

## 2017-08-31 NOTE — PATIENT INSTRUCTIONS
[facility] Medication Refill Request Information:  * Please contact your pharmacy regarding ANY request for medication refills.  ** [facility] Prescription Fax = [phone]  * Please allow 3 business days for routine medication refills.  * Please allow 5 business days for controlled substance medication refills.     [facility] Test Result notification information:  *You will be notified with in 7-10 days of your appointment day regarding the results of your test.  If you are on MyChart you will be notified as soon as the provider has reviewed the results and signed off on them.    [facility] [phone]

## 2017-08-31 NOTE — NURSING NOTE
"Chief Complaint   Patient presents with     Results     patient states she needs to talk about \"blood issue\"     CLARA SOW at 9:57 AM on 8/31/2017.    "

## 2017-08-31 NOTE — PROGRESS NOTES
"  SUBJECTIVE:  Aguilar Genao is a 42 year-old female with a history of alcoholic cirrhosis of the liver.  She has been abstinent for over 5 years. She is currently following at [facility] for her cares and had a referral to authorize her to be seen here at [facility] for her anemia through the end of September 2017 but the financial Counsellor stopped her at the door today which was embarrassing . Last menses was 2 months ago. She thought I could order her infusions to be done earlier but I discussed now she has another primary and insurance requiring her to be seen at [facility] therefore she needs to contact her team at [facility] to assist with her cares.   She has a hemoglobin of 7.6 She has infusions scheduled September 29 through [facility].   I encouraged her to call her MD at [facility] and indicate she is feeling very fatigued chilled and would like to consider a transfusion. I discussed we will not be able to coordinate cares here if her insurance does not allow.  She needs a  and I gave her the number through [facility] . I discussed I will no charge the visit for her today.Her questions were addressed and she voiced understanding and agreement with the above.          OBJECTIVE: /83  Pulse 73  Resp 16  Ht 1.67 m (5' 5.75\")  Wt 117 kg (258 lb)  BMI 41.96 kg/m2  GENERAL:  Examination reveals a female who is in no acute distress other than she looks cold and anemic  HEENT:    Conjunctival pallor  PSYCHIATRIC:  Mood is stable.  She has good interaction.  She does appear slightly tired today. Mildly tangential    ASSESSMENT AND PLAN:  Aguilar Genao is a 42-year-old female with chronic alcoholic cirrhosis of the liver with abstinent from alcohol for approximately 5 years.    She is currently following at [facility] for her cares and had a referral to authorize her to be seen here at [facility] for her anemia through the end of September 2017 but the financial Counsellor " stopped her at the door today which was embarrassing . Last menses was 2 months ago. She thought I could order her infusions to be done earlier but I discussed now she has another primary and insurance requiring her to be [place] [facility] therefore she needs to contact her team at [facility] to assist with her cares.   She has a hemoglobin of 7.6 She has infusions scheduled September 29 through [facility].   I encouraged her to call her MD at [facility] and indicate she is feeling very fatigued chilled and would like to consider a transfusion. I discussed we will not be able to coordinate cares here if her insurance does not allow. She should seek emergent care if not able to coordinate cares.  She needs a  and I gave her the number through [facility] . I discussed I will no charge the visit for her today.Her questions were addressed and she voiced understanding and agreement with the above.    Aren Ricks MD

## 2017-11-16 ENCOUNTER — TELEPHONE (OUTPATIENT)
Dept: INTERNAL MEDICINE | Facility: CLINIC | Age: 42
End: 2017-11-16

## 2017-11-16 DIAGNOSIS — F51.01 PRIMARY INSOMNIA: ICD-10-CM

## 2017-11-19 RX ORDER — TRAZODONE HYDROCHLORIDE 50 MG/1
75 TABLET, FILM COATED ORAL AT BEDTIME
Qty: 135 TABLET | Refills: 2 | Status: SHIPPED | OUTPATIENT
Start: 2017-11-19 | End: 2018-01-10 | Stop reason: SINTOL

## 2017-11-20 NOTE — TELEPHONE ENCOUNTER
She has another primary and insurance requiring her to be seen at [facility] therefore she needs to contact her team at [facility] to assist with her cares.  Best wishes,  Aren Ricks MD

## 2017-11-20 NOTE — TELEPHONE ENCOUNTER
Last Written Prescription Date:  10/27/16  Last Fill Quantity: 500g,   # refills: 11  Last Office Visit : 8/31/17  Future Office visit:  NONE  Routing refill request to provider for review/approval because:  Drug not on refill protocol

## 2017-11-20 NOTE — TELEPHONE ENCOUNTER
She has another primary and insurance requiring her to be seen at [facility] therefore she needs to contact her team at [facility] to assist with her cares.  Best wishes,  Aren Ricks MD

## 2017-11-20 NOTE — TELEPHONE ENCOUNTER
Last Written Prescription Date:  8/7/17  Last Fill Quantity: 135,   # refills: 0  Last Office Visit : 8/31/17  Future Office visit:  NONE

## 2017-11-29 NOTE — PROGRESS NOTES
She has another primary and insurance requiring her to be seen at [facility] therefore she needs to contact her team at [facility] to assist with her cares. I cancelled iron infusion.  Best wishes,  Aren Ricks MD

## 2018-01-10 ENCOUNTER — OFFICE VISIT (OUTPATIENT)
Dept: GASTROENTEROLOGY | Facility: CLINIC | Age: 43
End: 2018-01-10
Attending: INTERNAL MEDICINE
Payer: COMMERCIAL

## 2018-01-10 VITALS
HEART RATE: 79 BPM | WEIGHT: 266 LBS | HEIGHT: 66 IN | BODY MASS INDEX: 42.75 KG/M2 | SYSTOLIC BLOOD PRESSURE: 135 MMHG | TEMPERATURE: 98.7 F | DIASTOLIC BLOOD PRESSURE: 86 MMHG | OXYGEN SATURATION: 97 %

## 2018-01-10 DIAGNOSIS — K75.81 NASH (NONALCOHOLIC STEATOHEPATITIS): ICD-10-CM

## 2018-01-10 DIAGNOSIS — K70.10 STEATOHEPATITIS, ALCOHOLIC (H): ICD-10-CM

## 2018-01-10 DIAGNOSIS — K70.10 STEATOHEPATITIS, ALCOHOLIC (H): Primary | ICD-10-CM

## 2018-01-10 LAB
ALBUMIN SERPL-MCNC: 3.2 G/DL (ref 3.4–5)
ALP SERPL-CCNC: 91 U/L (ref 40–150)
ALT SERPL W P-5'-P-CCNC: 36 U/L (ref 0–50)
ANION GAP SERPL CALCULATED.3IONS-SCNC: 6 MMOL/L (ref 3–14)
AST SERPL W P-5'-P-CCNC: 46 U/L (ref 0–45)
BILIRUB DIRECT SERPL-MCNC: 0.2 MG/DL (ref 0–0.2)
BILIRUB SERPL-MCNC: 0.7 MG/DL (ref 0.2–1.3)
BUN SERPL-MCNC: 4 MG/DL (ref 7–30)
CALCIUM SERPL-MCNC: 8.2 MG/DL (ref 8.5–10.1)
CHLORIDE SERPL-SCNC: 106 MMOL/L (ref 94–109)
CO2 SERPL-SCNC: 27 MMOL/L (ref 20–32)
CREAT SERPL-MCNC: 0.78 MG/DL (ref 0.52–1.04)
ERYTHROCYTE [DISTWIDTH] IN BLOOD BY AUTOMATED COUNT: 19.7 % (ref 10–15)
GFR SERPL CREATININE-BSD FRML MDRD: 80 ML/MIN/1.7M2
GLUCOSE SERPL-MCNC: 90 MG/DL (ref 70–99)
HCT VFR BLD AUTO: 39.2 % (ref 35–47)
HGB BLD-MCNC: 12.6 G/DL (ref 11.7–15.7)
INR PPP: 1.29 (ref 0.86–1.14)
MCH RBC QN AUTO: 25.3 PG (ref 26.5–33)
MCHC RBC AUTO-ENTMCNC: 32.1 G/DL (ref 31.5–36.5)
MCV RBC AUTO: 79 FL (ref 78–100)
PLATELET # BLD AUTO: 179 10E9/L (ref 150–450)
POTASSIUM SERPL-SCNC: 3.6 MMOL/L (ref 3.4–5.3)
PROT SERPL-MCNC: 8.6 G/DL (ref 6.8–8.8)
RBC # BLD AUTO: 4.98 10E12/L (ref 3.8–5.2)
SODIUM SERPL-SCNC: 139 MMOL/L (ref 133–144)
WBC # BLD AUTO: 4.4 10E9/L (ref 4–11)

## 2018-01-10 PROCEDURE — 85027 COMPLETE CBC AUTOMATED: CPT | Performed by: PHYSICIAN ASSISTANT

## 2018-01-10 PROCEDURE — 36415 COLL VENOUS BLD VENIPUNCTURE: CPT | Performed by: PHYSICIAN ASSISTANT

## 2018-01-10 PROCEDURE — 80048 BASIC METABOLIC PNL TOTAL CA: CPT | Performed by: PHYSICIAN ASSISTANT

## 2018-01-10 PROCEDURE — 91200 LIVER ELASTOGRAPHY: CPT | Mod: ZF

## 2018-01-10 PROCEDURE — 85610 PROTHROMBIN TIME: CPT | Performed by: PHYSICIAN ASSISTANT

## 2018-01-10 PROCEDURE — 80076 HEPATIC FUNCTION PANEL: CPT | Performed by: PHYSICIAN ASSISTANT

## 2018-01-10 PROCEDURE — G0463 HOSPITAL OUTPT CLINIC VISIT: HCPCS | Mod: ZF

## 2018-01-10 ASSESSMENT — PAIN SCALES - GENERAL: PAINLEVEL: NO PAIN (0)

## 2018-01-10 NOTE — LETTER
"1/10/2018      RE: Aguilar Genao  [address]   [facility] [address]       [facility]    Hepatology follow-up    Follow-up visit for alcoholic hepatitis    Subjective:  43 year old female presenting for follow up of alcoholic liver disease, which was diagnosed after presenting with acute alcoholic hepatitis in 2013, question of cirrhosis given no overt signs of decompensation. She was last seen in clinic November 2016. Patient did not have MR elastography as ordererd. She had some problems getting labs and imaging due to issues with switching insurance.     She does have some intermittent lower extremity edema over the past year, none lately. She has continued to take spironolactone 25 mg daily. She was prescribed Lasix about 6 months ago and uses it intermittently, last required about a month ago. She denies fluid in abdomen, has never had a paracentesis. She was in the ER in the past year due to chest pain and she was told she had GERD.     She has lost about 20 lbs since December 2016. She has changed to a vegan diet in the past few months. She is fatigued at times. Feels a little bloated with the change of her diet, but denies any abdominal pain. Three times a year she has some gagging noting \"pink in the sink.\" but no juni blood. She states this is due to her gastric banding. She [place] [facility] inf[place]. Her last ETOH use was 5 years after being diagnosed with liver disease.      Patient denies jaundice, lower extremity edema, abdominal distension, lethargy or confusion.  Patient denies melena or hematechezia. Patient denies fevers, sweats or chills.     Bilirubin: 0.7 - stable  AST - 36, elevated from 6/20/17  ALT - 46, elevated from from 6/20/17  Alk phos - 91 elevated  Platelets - 228,000 in 9/25/17, decreased to 179,000 today  INR - 1.29, remaining stable    Surveillence:   EGD done 11/11/2015 showing normal " esophagus, Patent vertical banded gastroplasty and intact staple line  HCC screening: u/s done on 12/21/17, noting cholelithiasis, mild prominence of CBD at 7 mm, no intrahepatic mass      Medical hx Surgical hx   Past Medical History:   Diagnosis Date     Alcohol abuse      Cirrhosis of liver (H)      Diverticulosis of large intestine without diverticulitis      Fibroids      Morbid obesity with BMI of 40.0-44.9, adult (H)      Thyroid disease       Past Surgical History:   Procedure Laterality Date     Beriatric surgery  1992    Stomach stapling [facility]     DILATION AND CURETTAGE       ESOPHAGOSCOPY, GASTROSCOPY, DUODENOSCOPY (EGD), COMBINED N/A 11/11/2015    Procedure: COMBINED ESOPHAGOSCOPY, GASTROSCOPY, DUODENOSCOPY (EGD), BIOPSY SINGLE OR MULTIPLE;  Surgeon: Lm Starr MD;  Location:  GI          Medications  Prior to Admission medications    Medication Sig Start Date End Date Taking? Authorizing Provider   traZODone (DESYREL) 50 MG tablet Take 1.5 tablets (75 mg) by mouth At Bedtime 11/19/17  Yes Aren Ricks MD   levothyroxine (SYNTHROID/LEVOTHROID) 25 MCG tablet Take 1 tablet (25 mcg) by mouth daily 8/7/17  Yes Aren Ricks MD   Alcohol Swabs (ALCOHOL WIPES) 70 % PADS 1 each every 30 days 6/3/17  Yes Aren Ricks MD   ferrous sulfate 220 (44 FE) MG/5ML LIQD Take 10 mLs by mouth 2 times daily (with meals) 1/30/17  Yes Aren Ricsk MD   medroxyPROGESTERone (PROVERA) 10 MG tablet 1 tab po daily 1/3/17  Yes Aren Ricks MD   hydrOXYzine (ATARAX) 25 MG tablet Take one or two at bedtime for pruritis or sleep 12/19/16  Yes Aren Ricks MD   cetirizine (ZYRTEC) 10 MG tablet Take 1 tablet (10 mg) by mouth daily 11/21/16  Yes Aren Ricks MD   diphenhydrAMINE (BENADRYL) 25 MG tablet Take 50 mg by mouth every 6 hours as needed for itching   Yes Reported, Patient   calcium carbonate (TUMS) 500 MG chewable tablet Take 1 tablet (500 mg) by mouth daily  "11/4/16  Yes Aren Ricks MD   spironolactone (ALDACTONE) 25 MG tablet Take 1 tablet (25 mg) by mouth daily 10/27/16  Yes Aren Ricks MD   clotrimazole (LOTRIMIN) 1 % cream Apply topically 2 times daily To areas of 10/27/16  Yes Aren Ricks MD   Miconazole Nitrate 2 % POWD Use twice daily to intertriginous areas for yeast infection when present 10/27/16  Yes Aren Ricks MD   cyanocobalamin (VITAMIN B12) 1000 MCG/ML injection Inject 1 mL (1,000 mcg) into the muscle every 30 days 10/27/16  Yes Aren Ricks MD   Syringe/Needle, Disp, (SYRINGE LUER LOCK) 25G X 1\" 3 ML MISC 1 each every 30 days 10/27/16  Yes Aren Ricks MD   order for DME Equipment being ordered: shower chair 9/21/15  Yes Aren Ricks MD     Allergies  No Known Allergies    Review of systems  A 10-point review of systems was negative    Examination  /86 (BP Location: Right arm, Patient Position: Sitting, Cuff Size: Adult Large)  Pulse 79  Temp 98.7  F (37.1  C) (Oral)  Ht 1.67 m (5' 5.75\")  Wt 120.7 kg (266 lb)  SpO2 97%  BMI 43.26 kg/m2  Body mass index is 43.26 kg/(m^2).    Gen- well, NAD, A+Ox3, normal color  Lym- no palpable LAD  CVS- RRR  RS- CTA  Abd- obese, nontender. mild hepatomegaly, no papable splenomegaly. No ascites  Extr- hands normal, no ENRIQUE  Skin- no rash or jaundice  Neuro- no asterixis  Psych- normal mood    Laboratory  Lab Results   Component Value Date     06/20/2017    POTASSIUM 3.6 06/20/2017    CHLORIDE 104 06/20/2017    CO2 28 06/20/2017    BUN 9 06/20/2017    CR 0.76 06/20/2017       Lab Results   Component Value Date    BILITOTAL 0.5 06/20/2017    ALT 21 06/20/2017    AST 30 06/20/2017    ALKPHOS 54 06/20/2017       Lab Results   Component Value Date    ALBUMIN 3.2 06/20/2017    PROTTOTAL 8.0 06/20/2017        Lab Results   Component Value Date    WBC 4.0 06/20/2017    HGB 7.9 06/20/2017    MCV 67 06/20/2017     06/20/2017     Lab Results "   Component Value Date    INR 1.34 06/20/2017     Radiology:  Abdomen Complete:   1. Cholelithiasis without sonographic evidence of cholecystitis.  2. Mild prominence of the common bile duct at 7 mm. This may be the patient's normal anatomy or may be secondary to a downstream stone or obstruction. Correlate with symptoms.  3. No intrahepatic mass.    I have personally reviewed the image(s) and initial interpretation, and I agree with the findings as documented by the resident/fellow.    Assessment  43 year old female with history of alcohol hepatitis, there was question of cirrhosis previously as she did not previously show any signs of decompensation. Given patient's obesity, history of dyslipidemia and HTN, she likely has element of KWOK. She has continued Spironolactone and needed Lasix intermittently over the past year. No signs of ascites. Patient's last EGD in 2015 did not show any varices. Today her INR is elevated 1.34 and has been persistently elevated, her AST was mildly elevated at 46.  Her recent abdominal ultrasound did not show any hepatic lesion. She was congratulated on her continued sobriety.      Plan  - Fibroscan today   - If Fibroscan showing cirrhosis, she will need continued surveillance with upper endoscopy and HCC surveillance with ultrasound every six months. Agree with colonoscopy for work up of persistent anemia.   - Labs pending at the time of visit, will contact patient with results  - Counseled for continued weight loss and healthy nutrition   - Can follow up with Hepatologist at [facility] due to insurance and follow up as needed    Grabiel Burleson PA-C  Hepatology  [facility]    Aguilar Genao was seen and evaluated today as apart of a shared APRN/PA visit. I reviewed today's findings with patient with history of alcoholic cirrhosis. My key exam findings include no jaundice or lower extremity edema. Key management decisions made by me and carried out under my  direction include Fibroscan today to assess degree fibrosis. If patient has cirrhosis, she will be due for an EGD this year and an ultrasound every 6 months for HCC screening. [place] [facility] [place] but she can certainly return to clinic if her status worsens.   Stella Jin MD  Associate professor of Medicine   GI Division, Department of Medicine,  [facility].

## 2018-01-10 NOTE — PROGRESS NOTES
"[facility] [place]    Hepatology follow-up    Follow-up visit for alcoholic hepatitis    Subjective:  43 year old female presenting for follow up of alcoholic liver disease, which was diagnosed after presenting with acute alcoholic hepatitis in 2013, question of cirrhosis given no overt signs of decompensation. She was last seen in clinic November 2016. Patient did not have MR elastography as ordererd. She had some problems getting labs and imaging due to issues with switching insurance.     She does have some intermittent lower extremity edema over the past year, none lately. She has continued to take spironolactone 25 mg daily. She was prescribed Lasix about 6 months ago and uses it intermittently, last required about a month ago. She denies fluid in abdomen, has never had a paracentesis. She was in the ER in the past year due to chest pain and she was told she had GERD.     She has lost about 20 lbs since December 2016. She has changed to a vegan diet in the past few months. She is fatigued at times. Feels a little bloated with the change of her diet, but denies any abdominal pain. Three times a year she has some gagging noting \"pink in the sink.\" but no juni blood. She states this is due to her gastric banding. She is seeing hem/onc for anemia work-up at [facility], getting Injectafer infusions. Her last ETOH use was 5 years after being diagnosed with liver disease.      Patient denies jaundice, lower extremity edema, abdominal distension, lethargy or confusion.  Patient denies melena or hematechezia. Patient denies fevers, sweats or chills.     Bilirubin: 0.7 - stable  AST - 36, elevated from 6/20/17  ALT - 46, elevated from from 6/20/17  Alk phos - 91 elevated  Platelets - 228,000 in 9/25/17, decreased to 179,000 today  INR - 1.29, remaining stable    Surveillence:   EGD done 11/11/2015 showing normal esophagus, Patent vertical banded gastroplasty and intact staple line  HCC screening: u/s done " on 12/21/17, noting cholelithiasis, mild prominence of CBD at 7 mm, no intrahepatic mass      Medical hx Surgical hx   Past Medical History:   Diagnosis Date     Alcohol abuse      Cirrhosis of liver (H)      Diverticulosis of large intestine without diverticulitis      Fibroids      Morbid obesity with BMI of 40.0-44.9, adult (H)      Thyroid disease       Past Surgical History:   Procedure Laterality Date     Beriatric surgery  1992    Stomach stapling [facility]     DILATION AND CURETTAGE       ESOPHAGOSCOPY, GASTROSCOPY, DUODENOSCOPY (EGD), COMBINED N/A 11/11/2015    Procedure: COMBINED ESOPHAGOSCOPY, GASTROSCOPY, DUODENOSCOPY (EGD), BIOPSY SINGLE OR MULTIPLE;  Surgeon: Lm Starr MD;  Location[facility]          Medications  Prior to Admission medications    Medication Sig Start Date End Date Taking? Authorizing Provider   traZODone (DESYREL) 50 MG tablet Take 1.5 tablets (75 mg) by mouth At Bedtime 11/19/17  Yes Aren Ricks MD   levothyroxine (SYNTHROID/LEVOTHROID) 25 MCG tablet Take 1 tablet (25 mcg) by mouth daily 8/7/17  Yes Aren Ricks MD   Alcohol Swabs (ALCOHOL WIPES) 70 % PADS 1 each every 30 days 6/3/17  Yes Aren Ricks MD   ferrous sulfate 220 (44 FE) MG/5ML LIQD Take 10 mLs by mouth 2 times daily (with meals) 1/30/17  Yes Aren Ricks MD   medroxyPROGESTERone (PROVERA) 10 MG tablet 1 tab po daily 1/3/17  Yes Aren Ricks MD   hydrOXYzine (ATARAX) 25 MG tablet Take one or two at bedtime for pruritis or sleep 12/19/16  Yes Aren Ricks MD   cetirizine (ZYRTEC) 10 MG tablet Take 1 tablet (10 mg) by mouth daily 11/21/16  Yes Aren Ricks MD   diphenhydrAMINE (BENADRYL) 25 MG tablet Take 50 mg by mouth every 6 hours as needed for itching   Yes Reported, Patient   calcium carbonate (TUMS) 500 MG chewable tablet Take 1 tablet (500 mg) by mouth daily 11/4/16  Yes Aren Ricks MD   spironolactone (ALDACTONE) 25 MG tablet Take 1 tablet (25  "mg) by mouth daily 10/27/16  Yes Aren Ricks MD   clotrimazole (LOTRIMIN) 1 % cream Apply topically 2 times daily To areas of 10/27/16  Yes Aren Ricks MD   Miconazole Nitrate 2 % POWD Use twice daily to intertriginous areas for yeast infection when present 10/27/16  Yes Aren Ricks MD   cyanocobalamin (VITAMIN B12) 1000 MCG/ML injection Inject 1 mL (1,000 mcg) into the muscle every 30 days 10/27/16  Yes Aren Ricks MD   Syringe/Needle, Disp, (SYRINGE LUER LOCK) 25G X 1\" 3 ML MISC 1 each every 30 days 10/27/16  Yes Aren Ricks MD   order for DME Equipment being ordered: shower chair 9/21/15  Yes Aren Ricks MD       Allergies  No Known Allergies    Review of systems  A 10-point review of systems was negative    Examination  /86 (BP Location: Right arm, Patient Position: Sitting, Cuff Size: Adult Large)  Pulse 79  Temp 98.7  F (37.1  C) (Oral)  Ht 1.67 m (5' 5.75\")  Wt 120.7 kg (266 lb)  SpO2 97%  BMI 43.26 kg/m2  Body mass index is 43.26 kg/(m^2).    Gen- well, NAD, A+Ox3, normal color  Lym- no palpable LAD  CVS- RRR  RS- CTA  Abd- obese, nontender. mild hepatomegaly, no papable splenomegaly. No ascites  Extr- hands normal, no ENRIQUE  Skin- no rash or jaundice  Neuro- no asterixis  Psych- normal mood    Laboratory  Lab Results   Component Value Date     06/20/2017    POTASSIUM 3.6 06/20/2017    CHLORIDE 104 06/20/2017    CO2 28 06/20/2017    BUN 9 06/20/2017    CR 0.76 06/20/2017       Lab Results   Component Value Date    BILITOTAL 0.5 06/20/2017    ALT 21 06/20/2017    AST 30 06/20/2017    ALKPHOS 54 06/20/2017       Lab Results   Component Value Date    ALBUMIN 3.2 06/20/2017    PROTTOTAL 8.0 06/20/2017        Lab Results   Component Value Date    WBC 4.0 06/20/2017    HGB 7.9 06/20/2017    MCV 67 06/20/2017     06/20/2017       Lab Results   Component Value Date    INR 1.34 06/20/2017       Radiology:  Abdomen Complete:   1. " Cholelithiasis without sonographic evidence of cholecystitis.  2. Mild prominence of the common bile duct at 7 mm. This may be the patient's normal anatomy or may be secondary to a downstream stone or obstruction. Correlate with symptoms.  3. No intrahepatic mass.    I have personally reviewed the image(s) and initial interpretation, and I agree with the findings as documented by the resident/fellow.    Assessment  43 year old female with history of alcohol hepatitis, there was question of cirrhosis previously as she did not previously show any signs of decompensation. Given patient's obesity, history of dyslipidemia and HTN, she likely has element of KWOK. She has continued Spironolactone and needed Lasix intermittently over the past year. No signs of ascites. Patient's last EGD in 2015 did not show any varices. Today her INR is elevated 1.34 and has been persistently elevated, her AST was mildly elevated at 46.  Her recent abdominal ultrasound did not show any hepatic lesion. She was congratulated on her continued sobriety.      Plan  - Fibroscan today   - If Fibroscan showing cirrhosis, she will need continued surveillance with upper endoscopy and HCC surveillance with ultrasound every six months. Agree with colonoscopy for work up of persistent anemia.   - Labs pending at the time of visit, will contact patient with results  - Counseled for continued weight loss and healthy nutrition   - Can [place] [facility] [place] and follow up as needed    Grabiel Burleson PA-C  Hepatology  [facility]    Aguilar Genao was seen and evaluated today as apart of a shared APRN/PA visit. I reviewed today's findings with patient with history of alcoholic cirrhosis. My key exam findings include no jaundice or lower extremity edema. Key management decisions made by me and carried out under my direction include Fibroscan today to assess degree fibrosis. If patient has cirrhosis,  she will be due for an EGD this year and an ultrasound every 6 months for HCC screening. She can follow-up with Hepatologist at [facility] [place] given problems with insurance but she can certainly return to clinic if her status worsens.     Stella Jin MD  Associate professor of Medicine   GI Division, Department of Medicine,  [facility].

## 2018-01-10 NOTE — MR AVS SNAPSHOT
"              After Visit Summary   1/10/2018    Aguilar Genao    MRN: [id number]           Patient Information     Date Of Birth          1975        Visit Information        Provider Department      1/10/2018 10:10 AM Stella Jin MD [facility] Hepatology        Today's Diagnoses     Steatohepatitis, alcoholic    -  1    KWOK (nonalcoholic steatohepatitis)          Care Instructions    1) Fibrosis scan today  2) Labs today          Follow-ups after your visit        Who to contact     If you have questions or need follow up information about today's clinic visit or your schedule please contact [facility] HEPATOLOG[facility] directly at [phone].  Normal or non-critical lab and imaging results will be communicated to you by Tritonhart, letter or phone within 4 business days after the clinic has received the results. If you do not hear from us within 7 days, please contact the clinic through Blogviot or phone. If you have a critical or abnormal lab result, we will notify you by phone as soon as possible.  Submit refill requests through Technorides or call your pharmacy and they will forward the refill request to us. Please allow 3 business days for your refill to be completed.          Additional Information About Your Visit        MyChart Information     [employer]ides gives you secure access to your electronic health record. If you see a primary care provider, you can also send messages to your care team and make appointments. If you have questions, please call your primary care clinic.  If you do not have a primary care provider, please call [phone] and they will assist you.        Care EveryWhere ID     This is your Care EveryWhere ID. This could be used by other organizations to access your [place] medical records  [id number]        Your Vitals Were     Pulse Temperature Height Pulse Oximetry BMI (Body Mass Index)       79 98.7  F (37.1  C) (Oral) 1.67 m (5' 5.75\") 97% 43.26 kg/m2        Blood Pressure " from Last 3 Encounters:   01/10/18 135/86   08/31/17 123/83   02/17/17 128/60    Weight from Last 3 Encounters:   01/10/18 120.7 kg (266 lb)   08/31/17 117 kg (258 lb)   11/16/16 127.5 kg (281 lb)                 Today's Medication Changes          These changes are accurate as of: 1/10/18  4:54 PM.  If you have any questions, ask your nurse or doctor.               Stop taking these medicines if you haven't already. Please contact your care team if you have questions.     traZODone 50 MG tablet   Commonly known as:  DESYREL   Stopped by:  Stella Jni MD                    Primary Care Provider Office Phone # Fax #    Aren Ricks [phone] [address] [facility] [address]        Equal Access to Services     [facility]: Hadii usama lozoya hadasho Soari, waaxda luqadaha, qaybta kaalmada adefernandezyada, eduardo lux . So [facility] [phone].    ATENCIÓN: Si habla español, tiene a thompson disposición servicios gratuitos de asistencia lingüística. Roe al [phone].    We comply with applicable federal civil rights laws and [place] laws. We do not discriminate on the basis of race, color, national origin, age, disability, sex, sexual orientation, or gender identity.            Thank you!     Thank you for choosing [facility] HEPATOLOGY  for your care. Our goal is always to provide you with excellent care. Hearing back from our patients is one way we can continue to improve our services. Please take a few minutes to complete the written survey that you may receive in the mail after your visit with us. Thank you!             Your Updated Medication List - Protect others around you: Learn how to safely use, store and throw away your medicines at www.disposemymeds.org.          This list is accurate as of: 1/10/18  4:54 PM.  Always use your most recent med list.                   Brand Name Dispense Instructions for use Diagnosis    Alcohol Wipes 70  "% Pads     100 each    1 each every 30 days    Other iron deficiency anemia       calcium carbonate 500 MG chewable tablet    TUMS    100 tablet    Take 1 tablet (500 mg) by mouth daily    Hypocalcemia       cetirizine 10 MG tablet    zyrTEC    90 tablet    Take 1 tablet (10 mg) by mouth daily    Pruritic disorder       clotrimazole 1 % cream    LOTRIMIN    60 g    Apply topically 2 times daily To areas of    Tinea corporis       cyanocobalamin 1000 MCG/ML injection    VITAMIN B12    1 mL    Inject 1 mL (1,000 mcg) into the muscle every 30 days    Other dietary vitamin B12 deficiency anemia       ferrous sulfate 220 (44 FE) MG/5ML Liqd     473 mL    Take 10 mLs by mouth 2 times daily (with meals)    Other iron deficiency anemias       hydrOXYzine 25 MG tablet    ATARAX    180 tablet    Take one or two at bedtime for pruritis or sleep    Pruritic disorder       LASIX PO      Take 10 mg by mouth daily as needed        levothyroxine 25 MCG tablet    SYNTHROID/LEVOTHROID    90 tablet    Take 1 tablet (25 mcg) by mouth daily    Hypothyroidism       Miconazole Nitrate 2 % Powd     500 g    Use twice daily to intertriginous areas for yeast infection when present    Tinea corporis       order for DME     1 Device    Equipment being ordered: shower chair    Cardiomegaly, Morbid obesity (H)       spironolactone 25 MG tablet    ALDACTONE    90 tablet    Take 1 tablet (25 mg) by mouth daily    Alcoholic cirrhosis of liver without ascites (H)       Syringe Luer Lock 25G X 1\" 3 ML Misc     1 each    1 each every 30 days    Other iron deficiency anemia         "

## 2018-01-16 ENCOUNTER — TELEPHONE (OUTPATIENT)
Dept: GASTROENTEROLOGY | Facility: CLINIC | Age: 43
End: 2018-01-16

## 2018-01-16 NOTE — TELEPHONE ENCOUNTER
Left a message regarding the result note on patient's recent labs and preliminary fibrosis scan results. Advised EGD via [facility] every 6 months for HCC screening. Left call back and sent letter/MyChart too.    Will await final fibrosis scan results before sending EGD orders to patient's preferred clinic.

## 2018-01-23 DIAGNOSIS — K75.81 NASH (NONALCOHOLIC STEATOHEPATITIS): Primary | ICD-10-CM

## 2018-10-11 ENCOUNTER — MEDICAL CORRESPONDENCE (OUTPATIENT)
Dept: HEALTH INFORMATION MANAGEMENT | Facility: CLINIC | Age: 43
End: 2018-10-11

## 2019-03-09 ENCOUNTER — HEALTH MAINTENANCE LETTER (OUTPATIENT)
Age: 44
End: 2019-03-09

## 2019-11-02 ENCOUNTER — HEALTH MAINTENANCE LETTER (OUTPATIENT)
Age: 44
End: 2019-11-02

## 2020-02-10 ENCOUNTER — HEALTH MAINTENANCE LETTER (OUTPATIENT)
Age: 45
End: 2020-02-10

## 2020-11-14 ENCOUNTER — HEALTH MAINTENANCE LETTER (OUTPATIENT)
Age: 45
End: 2020-11-14

## 2021-02-06 ENCOUNTER — HEALTH MAINTENANCE LETTER (OUTPATIENT)
Age: 46
End: 2021-02-06

## 2021-09-12 ENCOUNTER — HEALTH MAINTENANCE LETTER (OUTPATIENT)
Age: 46
End: 2021-09-12

## 2022-01-02 ENCOUNTER — HEALTH MAINTENANCE LETTER (OUTPATIENT)
Age: 47
End: 2022-01-02

## 2022-02-27 ENCOUNTER — HEALTH MAINTENANCE LETTER (OUTPATIENT)
Age: 47
End: 2022-02-27

## 2022-11-19 ENCOUNTER — HEALTH MAINTENANCE LETTER (OUTPATIENT)
Age: 47
End: 2022-11-19

## 2023-04-09 ENCOUNTER — HEALTH MAINTENANCE LETTER (OUTPATIENT)
Age: 48
End: 2023-04-09

## 2024-10-07 DIAGNOSIS — D50.8 OTHER IRON DEFICIENCY ANEMIA: ICD-10-CM

## 2024-10-11 RX ORDER — SYRINGE WITH NEEDLE, 1 ML 25GX5/8"
SYRINGE, EMPTY DISPOSABLE MISCELLANEOUS
Qty: 1 EACH | Refills: 0 | OUTPATIENT
Start: 2024-10-11

## 2024-10-17 DIAGNOSIS — D50.8 OTHER IRON DEFICIENCY ANEMIA: ICD-10-CM

## 2024-10-17 RX ORDER — SYRINGE WITH NEEDLE, 1 ML 25GX5/8"
SYRINGE, EMPTY DISPOSABLE MISCELLANEOUS
Qty: 1 EACH | Refills: 0 | OUTPATIENT
Start: 2024-10-17